# Patient Record
Sex: FEMALE | Race: WHITE
[De-identification: names, ages, dates, MRNs, and addresses within clinical notes are randomized per-mention and may not be internally consistent; named-entity substitution may affect disease eponyms.]

---

## 2017-09-03 ENCOUNTER — HEALTH MAINTENANCE LETTER (OUTPATIENT)
Age: 47
End: 2017-09-03

## 2017-10-25 ENCOUNTER — CARE COORDINATION (OUTPATIENT)
Dept: SURGERY | Facility: CLINIC | Age: 47
End: 2017-10-25

## 2017-10-25 NOTE — PROGRESS NOTES
New Patient Previously has had a Ruen Y Procedure 15 years ago in [place], [place]  Received: Today       Rayo Tyson Christina, NORA; Fior Camarillo, RN       Phone Number: [phone]                     Good Morning Team,      Patient called in today because she had spoken with someone a while a go (no notes on our end) she was told she needed to be mychart active and watch the Seminar, which she is active now and is watching the seminar, however she is having pain and some issues in the area where the surgery was done. She doesn't remember who the provider was by they are trying to get the clinic from [place] to send the letter and everything that is required. However because she is having the pain I figured I should at least f/up and see if either of you could call the patient back to discuss further.     Thank you     Rayo :)   Senior Intake Rep Team 3   Central Scheduling     Please DO NOT send this message and/or reply back to sender.  Call Center Representatives DO NOT respond to messages.       Called and spoke to patient.    She had her RNY in [place]. She will have all her records sent here. Pain is under her rib, she has seen her PCP for her pain, and her PCP has not done any testing but advised her that she needs to see a Bariatric team.    Original Operative note from your Gastric Bypass - most likely Archived at [facility]   Referral letter from your Primary care provider   Labs and Testing that have been performed regarding current health concern (within the last year)     Advised patient that if she has severe unbearable pain she should present to the nearest ED.

## 2019-11-07 ENCOUNTER — HEALTH MAINTENANCE LETTER (OUTPATIENT)
Age: 49
End: 2019-11-07

## 2020-02-23 ENCOUNTER — HEALTH MAINTENANCE LETTER (OUTPATIENT)
Age: 50
End: 2020-02-23

## 2020-12-06 ENCOUNTER — HEALTH MAINTENANCE LETTER (OUTPATIENT)
Age: 50
End: 2020-12-06

## 2021-02-14 ENCOUNTER — HEALTH MAINTENANCE LETTER (OUTPATIENT)
Age: 51
End: 2021-02-14

## 2021-09-25 ENCOUNTER — HEALTH MAINTENANCE LETTER (OUTPATIENT)
Age: 51
End: 2021-09-25

## 2022-01-15 ENCOUNTER — HEALTH MAINTENANCE LETTER (OUTPATIENT)
Age: 52
End: 2022-01-15

## 2022-03-12 ENCOUNTER — HEALTH MAINTENANCE LETTER (OUTPATIENT)
Age: 52
End: 2022-03-12

## 2022-12-26 ENCOUNTER — HEALTH MAINTENANCE LETTER (OUTPATIENT)
Age: 52
End: 2022-12-26

## 2023-04-22 ENCOUNTER — HEALTH MAINTENANCE LETTER (OUTPATIENT)
Age: 53
End: 2023-04-22